# Patient Record
Sex: MALE | Race: BLACK OR AFRICAN AMERICAN | NOT HISPANIC OR LATINO
[De-identification: names, ages, dates, MRNs, and addresses within clinical notes are randomized per-mention and may not be internally consistent; named-entity substitution may affect disease eponyms.]

---

## 2019-03-14 ENCOUNTER — TRANSCRIPTION ENCOUNTER (OUTPATIENT)
Age: 49
End: 2019-03-14

## 2020-12-10 ENCOUNTER — OUTPATIENT (OUTPATIENT)
Dept: OUTPATIENT SERVICES | Facility: HOSPITAL | Age: 50
LOS: 1 days | End: 2020-12-10

## 2020-12-17 DIAGNOSIS — Z11.59 ENCOUNTER FOR SCREENING FOR OTHER VIRAL DISEASES: ICD-10-CM

## 2021-03-26 PROBLEM — Z00.00 ENCOUNTER FOR PREVENTIVE HEALTH EXAMINATION: Status: ACTIVE | Noted: 2021-03-26

## 2021-03-31 ENCOUNTER — APPOINTMENT (OUTPATIENT)
Dept: DERMATOLOGY | Facility: CLINIC | Age: 51
End: 2021-03-31
Payer: COMMERCIAL

## 2021-03-31 ENCOUNTER — LABORATORY RESULT (OUTPATIENT)
Age: 51
End: 2021-03-31

## 2021-03-31 ENCOUNTER — NON-APPOINTMENT (OUTPATIENT)
Age: 51
End: 2021-03-31

## 2021-03-31 VITALS — WEIGHT: 150 LBS | HEIGHT: 69 IN | BODY MASS INDEX: 22.22 KG/M2

## 2021-03-31 PROCEDURE — 99203 OFFICE O/P NEW LOW 30 MIN: CPT

## 2021-03-31 PROCEDURE — 99072 ADDL SUPL MATRL&STAF TM PHE: CPT

## 2021-03-31 RX ORDER — DOXYCYCLINE HYCLATE 100 MG/1
100 CAPSULE ORAL
Qty: 28 | Refills: 0 | Status: ACTIVE | COMMUNITY
Start: 2021-03-31 | End: 1900-01-01

## 2021-04-05 ENCOUNTER — NON-APPOINTMENT (OUTPATIENT)
Age: 51
End: 2021-04-05

## 2021-04-20 ENCOUNTER — APPOINTMENT (OUTPATIENT)
Dept: DERMATOLOGY | Facility: CLINIC | Age: 51
End: 2021-04-20
Payer: COMMERCIAL

## 2021-04-20 PROCEDURE — 99213 OFFICE O/P EST LOW 20 MIN: CPT

## 2021-04-20 PROCEDURE — 99072 ADDL SUPL MATRL&STAF TM PHE: CPT

## 2021-04-20 RX ORDER — MELOXICAM 15 MG/1
15 TABLET ORAL
Qty: 21 | Refills: 0 | Status: ACTIVE | COMMUNITY
Start: 2021-02-11

## 2021-07-20 ENCOUNTER — APPOINTMENT (OUTPATIENT)
Dept: DERMATOLOGY | Facility: CLINIC | Age: 51
End: 2021-07-20
Payer: COMMERCIAL

## 2021-07-20 VITALS — HEIGHT: 69 IN | WEIGHT: 150 LBS | BODY MASS INDEX: 22.22 KG/M2

## 2021-07-20 PROCEDURE — 99072 ADDL SUPL MATRL&STAF TM PHE: CPT

## 2021-07-20 PROCEDURE — 99213 OFFICE O/P EST LOW 20 MIN: CPT

## 2021-07-20 RX ORDER — MUPIROCIN 20 MG/G
2 OINTMENT TOPICAL
Qty: 1 | Refills: 0 | Status: ACTIVE | COMMUNITY
Start: 2021-07-20 | End: 1900-01-01

## 2021-08-03 ENCOUNTER — APPOINTMENT (OUTPATIENT)
Dept: DERMATOLOGY | Facility: CLINIC | Age: 51
End: 2021-08-03
Payer: COMMERCIAL

## 2021-08-03 VITALS — HEIGHT: 69 IN | WEIGHT: 150 LBS | BODY MASS INDEX: 22.22 KG/M2

## 2021-08-03 DIAGNOSIS — L85.3 XEROSIS CUTIS: ICD-10-CM

## 2021-08-03 PROCEDURE — 99213 OFFICE O/P EST LOW 20 MIN: CPT

## 2022-01-27 ENCOUNTER — APPOINTMENT (OUTPATIENT)
Dept: DERMATOLOGY | Facility: CLINIC | Age: 52
End: 2022-01-27
Payer: COMMERCIAL

## 2022-01-27 PROCEDURE — 99213 OFFICE O/P EST LOW 20 MIN: CPT

## 2022-01-27 RX ORDER — CHLORHEXIDINE GLUCONATE 213 G/1000ML
4 SOLUTION TOPICAL
Qty: 1 | Refills: 11 | Status: ACTIVE | COMMUNITY
Start: 2021-07-20 | End: 1900-01-01

## 2022-03-03 ENCOUNTER — APPOINTMENT (OUTPATIENT)
Dept: DERMATOLOGY | Facility: CLINIC | Age: 52
End: 2022-03-03
Payer: COMMERCIAL

## 2022-03-03 PROCEDURE — 99213 OFFICE O/P EST LOW 20 MIN: CPT

## 2022-03-03 RX ORDER — CEPHALEXIN 500 MG/1
500 TABLET ORAL
Qty: 28 | Refills: 0 | Status: ACTIVE | COMMUNITY
Start: 2021-07-20 | End: 1900-01-01

## 2022-03-23 ENCOUNTER — APPOINTMENT (OUTPATIENT)
Dept: DERMATOLOGY | Facility: CLINIC | Age: 52
End: 2022-03-23
Payer: COMMERCIAL

## 2022-03-23 DIAGNOSIS — L02.429 FURUNCLE OF LIMB, UNSPECIFIED: ICD-10-CM

## 2022-03-23 PROCEDURE — 99213 OFFICE O/P EST LOW 20 MIN: CPT | Mod: GC

## 2022-10-26 ENCOUNTER — APPOINTMENT (OUTPATIENT)
Dept: ORTHOPEDIC SURGERY | Facility: CLINIC | Age: 52
End: 2022-10-26

## 2022-11-03 ENCOUNTER — APPOINTMENT (OUTPATIENT)
Dept: ORTHOPEDIC SURGERY | Facility: CLINIC | Age: 52
End: 2022-11-03
Payer: COMMERCIAL

## 2022-11-03 VITALS — BODY MASS INDEX: 22.22 KG/M2 | WEIGHT: 150 LBS | HEIGHT: 69 IN

## 2022-11-03 PROCEDURE — 99213 OFFICE O/P EST LOW 20 MIN: CPT

## 2022-11-03 PROCEDURE — 72110 X-RAY EXAM L-2 SPINE 4/>VWS: CPT

## 2022-11-03 PROCEDURE — 72170 X-RAY EXAM OF PELVIS: CPT

## 2022-11-03 PROCEDURE — 99203 OFFICE O/P NEW LOW 30 MIN: CPT

## 2022-11-03 NOTE — PHYSICAL EXAM
[Able to Communicate] : able to communicate [Well Developed] : well developed [Well Nourished] : well nourished [Peripheral vascular exam is grossly normal] : peripheral vascular exam is grossly normal [No Respiratory Distress] : no respiratory distress [Lungs clear to auscultation bilaterally] : lungs clear to auscultation bilaterally [Flexion] : flexion [Extension] : extension [5___] : right extensor hallicus longus 5[unfilled]/5 [No bony abnormalities] : No bony abnormalities [] : no thoracic paraspinal tenderness

## 2022-11-03 NOTE — HISTORY OF PRESENT ILLNESS
[Lower back] : lower back [Sudden] : sudden [4] : 4 [8] : 8 [Shooting] : shooting [Constant] : constant [Walking/activity] : walking/activity [Sitting] : sitting [Lying in bed] : lying in bed [Full time] : Work status: full time [de-identified] : 11/3/22: 53 y/o M with lumbar spine pain for the past 8 weeks that has gotten progressively worse within the past 3 weeks. He states he has a shooting pain down the leg to the calf. Does not disrupt sleep. He has taken OTC NSAIDS with some relief. He takes the [employer] and has trouble standing up. He states he is active in the gym. [] : no [FreeTextEntry5] : pt states he has been having pain shooting down his left  leg and hip for about a month ago  [FreeTextEntry7] : left leg  [de-identified] : rest

## 2022-11-13 ENCOUNTER — FORM ENCOUNTER (OUTPATIENT)
Age: 52
End: 2022-11-13

## 2022-11-14 ENCOUNTER — APPOINTMENT (OUTPATIENT)
Dept: MRI IMAGING | Facility: CLINIC | Age: 52
End: 2022-11-14

## 2022-11-14 PROCEDURE — 72148 MRI LUMBAR SPINE W/O DYE: CPT

## 2022-11-28 ENCOUNTER — APPOINTMENT (OUTPATIENT)
Dept: ORTHOPEDIC SURGERY | Facility: CLINIC | Age: 52
End: 2022-11-28

## 2022-11-28 VITALS — BODY MASS INDEX: 22.22 KG/M2 | HEIGHT: 69 IN | WEIGHT: 150 LBS

## 2022-11-28 PROCEDURE — 99214 OFFICE O/P EST MOD 30 MIN: CPT

## 2022-11-28 NOTE — HISTORY OF PRESENT ILLNESS
[Lower back] : lower back [Sudden] : sudden [8] : 8 [6] : 6 [Dull/Aching] : dull/aching [Radiating] : radiating [Sharp] : sharp [Shooting] : shooting [Intermittent] : intermittent [Heat] : heat [Sitting] : sitting [Lying in bed] : lying in bed [de-identified] : persisting lumbar spine pain;  got MRI since last visit; no new symptoms to report;  did some PT but it aggravated symptoms for a few days afterward [] : no [FreeTextEntry5] : YARI SARGENT is a 52 year old male presenting for MRI review. Pt states this morning he felt extreme pain and contemplated going to emergency room but decided against it.  [FreeTextEntry7] : Down both legs [de-identified] : MRI

## 2022-11-28 NOTE — ASSESSMENT
[FreeTextEntry1] : MRI with well defined right sided 4-5 disc herniation; remainder of spine is free of any pathology;  proposed an DONNELL at 4-5 due to the severity of the pain and then resume PT;

## 2022-12-13 ENCOUNTER — APPOINTMENT (OUTPATIENT)
Dept: PAIN MANAGEMENT | Facility: CLINIC | Age: 52
End: 2022-12-13

## 2022-12-13 VITALS — WEIGHT: 150 LBS | HEIGHT: 69 IN | BODY MASS INDEX: 22.22 KG/M2

## 2022-12-13 DIAGNOSIS — M51.26 OTHER INTERVERTEBRAL DISC DISPLACEMENT, LUMBAR REGION: ICD-10-CM

## 2022-12-13 DIAGNOSIS — M54.16 RADICULOPATHY, LUMBAR REGION: ICD-10-CM

## 2022-12-13 PROCEDURE — 99204 OFFICE O/P NEW MOD 45 MIN: CPT

## 2022-12-13 NOTE — HISTORY OF PRESENT ILLNESS
[Lower back] : lower back [6] : 6 [Radiating] : radiating [Shooting] : shooting [Work] : work [Sleep] : sleep [Sitting] : sitting [Standing] : standing [Bending forward] : bending forward [de-identified] : Initial HPI 12/13/2022:\par Pain started 3 months ago and is in the center of the lower back and radiates down the LEFT posterior thigh and to the knee described as a sharp shooting pain with associated numbness and tingling. \par \par Physical Therapy: Has been doing PT with no relief\par Pain Medications: Advil PRN \par Past Injections: none\par Spine surgery: none \par Blood thinners: none [] : no [FreeTextEntry7] : left side [de-identified] : mri l spine

## 2022-12-13 NOTE — DISCUSSION/SUMMARY
[de-identified] : After discussing various treatment options with the patient including but not limited to oral medications, physical therapy, exercise modalities as well as interventional spinal injections, we have decided with the following plan:\par \par - Continue Home exercises, stretching, activity modification, physical therapy, and conservative care.\par - MRI report and/or images was reviewed and discussed with the patient.\par - Recommend L4-5 Lumbar Epidural Steroid Injection under fluoroscopic guidance with image.\par - The risks, benefits and alternatives of the proposed procedure were explained in detail with the patient. The risks outlined include but are not limited to infection, bleeding, post-dural puncture headache, nerve injury, a temporary increase in pain, failure to resolve symptoms, allergic reaction, symptom recurrence, and possible elevation of blood sugar in diabetics. All questions were answered to patient's apparent satisfaction and he/she verbalized an understanding.\par - Patient is presenting with acute/sub-acute radicular pain with impairment in ADLs and functionality.  The pain has not responded to conservative care including NSAID therapy and/or physical therapy.  There is no bleeding tendency, unstable medical condition, or systemic infection.\par - Follow up in 1-2 weeks post injection for re-evaluation.\par

## 2022-12-13 NOTE — PHYSICAL EXAM
[de-identified] : Constitutional; Appears well, no apparent distress\par Ability to communicate: Normal \par Respiratory: non-labored breathing\par Skin: No rash noted\par Head: Normocephalic, atraumatic\par Neck: no visible thyroid enlargement\par Eyes: Extraocular movements intact\par Neurologic: Alert and oriented x3\par Psychiatric: normal mood, affect and behavior \par \par  [] : non-antalgic

## 2022-12-27 LAB — SARS-COV-2 N GENE NPH QL NAA+PROBE: NOT DETECTED

## 2022-12-28 ENCOUNTER — APPOINTMENT (OUTPATIENT)
Age: 52
End: 2022-12-28
Payer: COMMERCIAL

## 2022-12-28 PROCEDURE — 62323 NJX INTERLAMINAR LMBR/SAC: CPT

## 2023-01-09 ENCOUNTER — APPOINTMENT (OUTPATIENT)
Dept: ORTHOPEDIC SURGERY | Facility: CLINIC | Age: 53
End: 2023-01-09

## 2023-01-10 ENCOUNTER — APPOINTMENT (OUTPATIENT)
Dept: PAIN MANAGEMENT | Facility: CLINIC | Age: 53
End: 2023-01-10

## 2023-04-20 ENCOUNTER — APPOINTMENT (OUTPATIENT)
Dept: ORTHOPEDIC SURGERY | Facility: CLINIC | Age: 53
End: 2023-04-20
Payer: COMMERCIAL

## 2023-04-20 VITALS — HEIGHT: 69 IN | BODY MASS INDEX: 22.22 KG/M2 | WEIGHT: 150 LBS

## 2023-04-20 DIAGNOSIS — S83.401A SPRAIN OF UNSPECIFIED COLLATERAL LIGAMENT OF RIGHT KNEE, INITIAL ENCOUNTER: ICD-10-CM

## 2023-04-20 DIAGNOSIS — Z78.9 OTHER SPECIFIED HEALTH STATUS: ICD-10-CM

## 2023-04-20 PROCEDURE — 73564 X-RAY EXAM KNEE 4 OR MORE: CPT | Mod: RT

## 2023-04-20 PROCEDURE — 99213 OFFICE O/P EST LOW 20 MIN: CPT

## 2023-04-20 NOTE — HISTORY OF PRESENT ILLNESS
[Sudden] : sudden [7] : 7 [0] : 0 [Dull/Aching] : dull/aching [Localized] : localized [Intermittent] : intermittent [Leisure] : leisure [Rest] : rest [Stairs] : stairs [de-identified] : 4/20/2023: This is a 54 YO amle with right knee pain, onset after knee gave out while ascending stairs on. again happened 2 days afterwards. Patient reports when pain is high the knee gives out, and he has to stop and rest. Denies OTC nsaids. Denies night symptoms.  [] : Post Surgical Visit: no [FreeTextEntry3] : 4/4/23 [FreeTextEntry1] : right knee [FreeTextEntry5] : Patients knee gave out on him when he was running up the stairs. 2 weeks ago  [FreeTextEntry6] : weakness & instability in the knee [de-identified] : going up the stairs [de-identified] : used Band-Aid

## 2023-04-20 NOTE — ASSESSMENT
[FreeTextEntry1] : Underlying pathology and treatment options reviewed. \par 04/20/2023 xrays of the right knee, 4 views, reveals negative, questionable loose body\par Due to recurrent buckling/mechanical symptoms, plan for MRI of the right knee r/o LMT. \par OTC Aleve recommended. \par Questions answered. \par Activities as tolerated. \par \par The documentation recorded by the scribe accurately reflects the service I personally performed and the decisions made by me.\par I, Deuce Cook, attest that this documentation has been prepared under the direction and in the presence of Provider Jamaal Orourke MD.\par \par The patient was seen by Jamaal Orourke MD.\par The following radiographs were ordered and read by me during this patient's visit. I reviewed each radiograph in detail with the patient, and discussed the findings as highlighted below.

## 2023-04-20 NOTE — PHYSICAL EXAM
[Right] : right knee [NL (0)] : extension 0 degrees [5___] : hamstring 5[unfilled]/5 [Positive] : positive Troy [] : no erythema [FreeTextEntry8] : Questionable cyst lateral aspect.  [TWNoteComboBox7] : flexion 120 degrees

## 2023-04-21 ENCOUNTER — APPOINTMENT (OUTPATIENT)
Dept: MRI IMAGING | Facility: CLINIC | Age: 53
End: 2023-04-21

## 2023-04-21 ENCOUNTER — FORM ENCOUNTER (OUTPATIENT)
Age: 53
End: 2023-04-21

## 2023-04-22 ENCOUNTER — APPOINTMENT (OUTPATIENT)
Dept: MRI IMAGING | Facility: CLINIC | Age: 53
End: 2023-04-22
Payer: COMMERCIAL

## 2023-04-22 PROCEDURE — 73721 MRI JNT OF LWR EXTRE W/O DYE: CPT | Mod: RT

## 2023-04-27 ENCOUNTER — APPOINTMENT (OUTPATIENT)
Dept: ORTHOPEDIC SURGERY | Facility: CLINIC | Age: 53
End: 2023-04-27
Payer: COMMERCIAL

## 2023-04-27 DIAGNOSIS — M94.261 CHONDROMALACIA, RIGHT KNEE: ICD-10-CM

## 2023-04-27 DIAGNOSIS — M71.21 SYNOVIAL CYST OF POPLITEAL SPACE [BAKER], RIGHT KNEE: ICD-10-CM

## 2023-04-27 DIAGNOSIS — S86.911D STRAIN OF UNSPECIFIED MUSCLE(S) AND TENDON(S) AT LOWER LEG LEVEL, RIGHT LEG, SUBSEQUENT ENCOUNTER: ICD-10-CM

## 2023-04-27 PROCEDURE — 99214 OFFICE O/P EST MOD 30 MIN: CPT

## 2023-04-27 RX ORDER — MELOXICAM 15 MG/1
15 TABLET ORAL
Qty: 30 | Refills: 0 | Status: COMPLETED | COMMUNITY
Start: 2023-04-27 | End: 2023-05-27

## 2023-04-27 NOTE — HISTORY OF PRESENT ILLNESS
[de-identified] : 4/27/23: Here for MRI review, pain anterior aspect of the right knee. \par \par 4/20/2023: This is a 52 YO amle with right knee pain, onset after knee gave out while ascending stairs on. again happened 2 days afterwards. Patient reports when pain is high the knee gives out, and he has to stop and rest. Denies OTC nsaids. Denies night symptoms.

## 2023-04-27 NOTE — ASSESSMENT
[FreeTextEntry1] : Underlying pathology and treatment options reviewed. \par 04/20/2023 xrays of the right knee, 4 views, reveals negative, questionable loose body\par Due to recurrent buckling/mechanical symptoms, plan for MRI of the right knee r/o LMT. \par OTC Aleve recommended. \par Questions answered. \par Activities as tolerated. \par \par 04/27/2023: MRI reviewed and discussed.\par Findings of mild patella OA, and large bakers cyst. \par Prescribed mobic. \par Start PT and HEP to improve mechanics and reduce pain.\par Bakers cyst may have leaked and traveled down.\par Cyst could return, There may be an option of draining of failure to resolve symptoms with mobic, and PT, and pain is primary posterior.\par Apply ice to affected area.  Questions addressed.  Activity modifier as tolerated.\par \par The documentation recorded by the scribe accurately reflects the service I personally performed and the decisions made by me.\par I, Deuce Cook, attest that this documentation has been prepared under the direction and in the presence of Provider Jamaal Orourke MD.\par \par The patient was seen by Jamaal Orourke MD.\par The following radiographs were ordered and read by me during this patient's visit. I reviewed each radiograph in detail with the patient, and discussed the findings as highlighted below.

## 2023-04-27 NOTE — DATA REVIEWED
[MRI] : MRI [Right] : of the right [Knee] : knee [Report was reviewed and noted in the chart] : The report was reviewed and noted in the chart [I independently reviewed and interpreted images and report] : I independently reviewed and interpreted images and report [I reviewed the films/CD and agree] : I reviewed the films/CD and agree [FreeTextEntry1] : MRI right knee impression:\par 1. Chondral loss, joint space narrowing and subchondral edema in the medial aspect of the patellofemoral \par compartment with effusion, synovitis, and popliteal cyst which may have ruptured.\par 2. MCL laxity.\par 3. No meniscal tear, acute fracture or loose body.\par 4. Specifically, there is no lateral meniscal tear.\par E-signed by Nico Beltran MD 04/24/2023

## 2023-06-15 ENCOUNTER — APPOINTMENT (OUTPATIENT)
Dept: ORTHOPEDIC SURGERY | Facility: CLINIC | Age: 53
End: 2023-06-15

## 2023-06-20 ENCOUNTER — APPOINTMENT (OUTPATIENT)
Dept: ORTHOPEDIC SURGERY | Facility: CLINIC | Age: 53
End: 2023-06-20
Payer: COMMERCIAL

## 2023-06-20 VITALS — WEIGHT: 150 LBS | HEIGHT: 69 IN | BODY MASS INDEX: 22.22 KG/M2

## 2023-06-20 PROCEDURE — 99213 OFFICE O/P EST LOW 20 MIN: CPT

## 2023-06-20 NOTE — ASSESSMENT
[FreeTextEntry1] : Patient with history of lumbar radiculopathy which has since improved after lumbar epidural. Patient would like to continues PT and therefore Rx renewed. Would llike to return to  duties as tolerated. \par \par Patient seen by KARUNA White under the direct supervision of Armani Bradley MD\par

## 2023-06-20 NOTE — HISTORY OF PRESENT ILLNESS
[Lower back] : lower back [Sudden] : sudden [2] : 2 [Dull/Aching] : dull/aching [Radiating] : radiating [Sharp] : sharp [Shooting] : shooting [Intermittent] : intermittent [Heat] : heat [Sitting] : sitting [Lying in bed] : lying in bed [de-identified] : 6/20/23 Patient presents today in follow up, reports low back symptoms have improved since having LESI L4-5 with Dr. Ambriz in Dec 2022.. Patient reports significant relief of back and radicular symptoms. Patient would like to return to  duties. Patient takes tylenol prn. Patient continues to do HEP would like to resume PT. \par \par 11/28/2022: persisting lumbar spine pain;  got MRI since last visit; no new symptoms to report;  did some PT but it aggravated symptoms for a few days afterward [] : no [FreeTextEntry5] : Follow up of low back pain. Had 1 epidural inj since last visit-helped pain. Needs new PT script . [FreeTextEntry7] : Down both legs [de-identified] : MRI

## 2024-02-05 ENCOUNTER — APPOINTMENT (OUTPATIENT)
Dept: ORTHOPEDIC SURGERY | Facility: CLINIC | Age: 54
End: 2024-02-05
Payer: COMMERCIAL

## 2024-02-05 DIAGNOSIS — M54.2 CERVICALGIA: ICD-10-CM

## 2024-02-05 PROCEDURE — 72050 X-RAY EXAM NECK SPINE 4/5VWS: CPT

## 2024-02-05 PROCEDURE — 99213 OFFICE O/P EST LOW 20 MIN: CPT

## 2024-02-06 NOTE — ASSESSMENT
[FreeTextEntry1] : 53 y/o M with acute neck pain, unclear whether radiculopathy present d/t underlying elbow issues, for 2 weeks, no injury. Xr's w/o fx, instabilities, collapses or deformities. Physical exam w/o weakness or loss of sensation. No prior tx.   - Discussed seeing how symptoms progress in a week, if no significant changes start PT, provided rx.  - F/up as symptoms dictate.

## 2024-02-06 NOTE — HISTORY OF PRESENT ILLNESS
[de-identified] : 02/05/24: Follow up of low back pain. Low back pain has subsided. Started feeling pain between shoulder blades 2 weeks ago. No specific injury. No clear radiation down arms. Reports underlying elbow pain which he is scheduled to see specialist. Pain is intermittent. Leaning forward worsens the pain. No prior tx.  [] : no [FreeTextEntry7] : Down both legs [de-identified] : MRI

## 2024-02-06 NOTE — IMAGING
[No bony abnormalities] : No bony abnormalities [No spinal deformity, fracture, lytic lesion, or marked single level collapse] : No spinal deformity, fracture, lytic lesion, or marked single level collapse [No instability seen on flexion/extension] : No instability seen on flexion/extension [de-identified] :  C Spine Inspection: No defects or deformities Palpation: No tenderness or spasms in trapezial, rhomboid or paracervical musculature ROM: Full with stiffness or diminished in all planes Strength: 5/5 b/l deltoid, biceps, triceps, wrist flexors, wrist extensors, abductors Neuro: Sensation I LT Negative Grullon's b/l Negative Spurling

## 2024-02-26 ENCOUNTER — APPOINTMENT (OUTPATIENT)
Dept: ORTHOPEDIC SURGERY | Facility: CLINIC | Age: 54
End: 2024-02-26
Payer: COMMERCIAL

## 2024-02-26 VITALS — BODY MASS INDEX: 22.22 KG/M2 | WEIGHT: 150 LBS | HEIGHT: 69 IN

## 2024-02-26 DIAGNOSIS — M77.8 OTHER ENTHESOPATHIES, NOT ELSEWHERE CLASSIFIED: ICD-10-CM

## 2024-02-26 DIAGNOSIS — S53.401A UNSPECIFIED SPRAIN OF RIGHT ELBOW, INITIAL ENCOUNTER: ICD-10-CM

## 2024-02-26 PROCEDURE — 99214 OFFICE O/P EST MOD 30 MIN: CPT | Mod: 25

## 2024-02-26 PROCEDURE — 73080 X-RAY EXAM OF ELBOW: CPT | Mod: RT

## 2024-02-26 PROCEDURE — J3490M: CUSTOM

## 2024-02-26 PROCEDURE — 20605 DRAIN/INJ JOINT/BURSA W/O US: CPT | Mod: RT

## 2024-02-26 NOTE — HISTORY OF PRESENT ILLNESS
[de-identified] : R elbow pain and cracking for 3-4 months No injury  He has numbness in the fingers  [Gradual] : gradual [6] : 6 [2] : 2 [Dull/Aching] : dull/aching [Tingling] : tingling [Nothing helps with pain getting better] : Nothing helps with pain getting better [] : no [FreeTextEntry1] : R elbow [FreeTextEntry5] : he has pain, no injury  [FreeTextEntry6] : numbness [FreeTextEntry7] : down to the hand  [de-identified] : activity

## 2024-02-26 NOTE — ASSESSMENT
[FreeTextEntry1] : R elbow joint injection was performed because of pain inflammation and stiffness Anesthesia: ethyl chloride sprayed topically Celestone 6mg: An injection of Celestone 1cc Lidocaine: An injection of Lidocaine 1% 1cc Marcaine: An injection of Marcaine 0.5% 1cc  Patient has tried OTC's including aspirin, Ibuprofen, Aleve etc or prescription NSAIDS, and/or exercises at home and/ or physical therapy without satisfactory response. After verbal consent using sterile preparation and technique. The risks, benefits, and alternatives to cortisone injection were explained in full to the patient. Risks outlined include but are not limited to infection, sepsis, bleeding, scarring, skin discoloration, temporary increase in pain, syncopal episode, failure to resolve symptoms, allergic reaction, symptom recurrence, and elevation of blood sugar in diabetics. Patient understood the risks. All questions were answered. After discussion of options, patient requested an injection. Oral informed consent was obtained and sterile prep was done of the injection site. Sterile technique was utilized for the procedure including the preparation of the solutions used for the injection. Patient tolerated the procedure well. Advised to ice the injection site this evening. Prep with betadine locally to site. Sterile technique used

## 2024-02-26 NOTE — PHYSICAL EXAM
[de-identified] : R elbow Pain with ROM No ulna nerve subluxation Stiffness Swelling  Xrays mild OA  R hand:  Tender volar wrist  Good finger ROM  +Tinels  +Phalens  +Compression test  Decreased sensation median nerve distribution

## 2024-02-29 ENCOUNTER — APPOINTMENT (OUTPATIENT)
Dept: NEUROLOGY | Facility: CLINIC | Age: 54
End: 2024-02-29
Payer: COMMERCIAL

## 2024-02-29 PROCEDURE — 95912 NRV CNDJ TEST 11-12 STUDIES: CPT

## 2024-02-29 PROCEDURE — 95886 MUSC TEST DONE W/N TEST COMP: CPT

## 2024-03-01 ENCOUNTER — APPOINTMENT (OUTPATIENT)
Dept: MRI IMAGING | Facility: CLINIC | Age: 54
End: 2024-03-01
Payer: COMMERCIAL

## 2024-03-01 PROCEDURE — 73221 MRI JOINT UPR EXTREM W/O DYE: CPT | Mod: RT

## 2024-03-11 ENCOUNTER — APPOINTMENT (OUTPATIENT)
Dept: ORTHOPEDIC SURGERY | Facility: CLINIC | Age: 54
End: 2024-03-11

## 2024-03-11 VITALS — WEIGHT: 150 LBS | HEIGHT: 69 IN | BODY MASS INDEX: 22.22 KG/M2

## 2024-03-19 ENCOUNTER — APPOINTMENT (OUTPATIENT)
Dept: ORTHOPEDIC SURGERY | Facility: CLINIC | Age: 54
End: 2024-03-19
Payer: COMMERCIAL

## 2024-03-19 VITALS — BODY MASS INDEX: 22.22 KG/M2 | HEIGHT: 69 IN | WEIGHT: 150 LBS

## 2024-03-19 DIAGNOSIS — G56.01 CARPAL TUNNEL SYNDROME, RIGHT UPPER LIMB: ICD-10-CM

## 2024-03-19 DIAGNOSIS — M19.021 PRIMARY OSTEOARTHRITIS, RIGHT ELBOW: ICD-10-CM

## 2024-03-19 PROCEDURE — 99213 OFFICE O/P EST LOW 20 MIN: CPT

## 2024-03-19 NOTE — PHYSICAL EXAM
[de-identified] : R elbow Pain with ROM No ulna nerve subluxation Stiffness Swelling   R hand:  Tender volar wrist  Good finger ROM  +Tinels  +Phalens  +Compression test  Decreased sensation median nerve distribution

## 2024-03-19 NOTE — HISTORY OF PRESENT ILLNESS
[Right Arm] : right arm [de-identified] : R elbow injection last visit only helped for about a day [6] : 6 [2] : 2 [Dull/Aching] : dull/aching [FreeTextEntry1] : R elbow  [de-identified] : inj